# Patient Record
Sex: FEMALE | Race: WHITE
[De-identification: names, ages, dates, MRNs, and addresses within clinical notes are randomized per-mention and may not be internally consistent; named-entity substitution may affect disease eponyms.]

---

## 2020-02-17 ENCOUNTER — HOSPITAL ENCOUNTER (EMERGENCY)
Dept: HOSPITAL 35 - ER | Age: 80
Discharge: HOME | End: 2020-02-17
Payer: COMMERCIAL

## 2020-02-17 VITALS — HEIGHT: 63 IN | WEIGHT: 160.01 LBS | BODY MASS INDEX: 28.35 KG/M2

## 2020-02-17 VITALS — DIASTOLIC BLOOD PRESSURE: 61 MMHG | SYSTOLIC BLOOD PRESSURE: 143 MMHG

## 2020-02-17 DIAGNOSIS — R33.9: Primary | ICD-10-CM

## 2020-02-17 DIAGNOSIS — I10: ICD-10-CM

## 2020-02-17 LAB
ALBUMIN SERPL-MCNC: 3.9 G/DL (ref 3.4–5)
ALT SERPL-CCNC: 31 U/L (ref 30–65)
ANION GAP SERPL CALC-SCNC: 10 MMOL/L (ref 7–16)
AST SERPL-CCNC: 38 U/L (ref 15–37)
BASOPHILS NFR BLD AUTO: 0.6 % (ref 0–2)
BILIRUB SERPL-MCNC: 0.6 MG/DL
BILIRUB UR-MCNC: NEGATIVE MG/DL
BUN SERPL-MCNC: 15 MG/DL (ref 7–18)
CALCIUM SERPL-MCNC: 9.3 MG/DL (ref 8.5–10.1)
CHLORIDE SERPL-SCNC: 97 MMOL/L (ref 98–107)
CO2 SERPL-SCNC: 25 MMOL/L (ref 21–32)
COLOR UR: YELLOW
CREAT SERPL-MCNC: 0.9 MG/DL (ref 0.6–1)
EOSINOPHIL NFR BLD: 0.7 % (ref 0–3)
ERYTHROCYTE [DISTWIDTH] IN BLOOD BY AUTOMATED COUNT: 14 % (ref 10.5–14.5)
GLUCOSE SERPL-MCNC: 119 MG/DL (ref 74–106)
GRANULOCYTES NFR BLD MANUAL: 78.3 % (ref 36–66)
HCT VFR BLD CALC: 41.6 % (ref 37–47)
HGB BLD-MCNC: 13.5 GM/DL (ref 12–15)
KETONES UR STRIP-MCNC: NEGATIVE MG/DL
LYMPHOCYTES NFR BLD AUTO: 11 % (ref 24–44)
MCH RBC QN AUTO: 27.6 PG (ref 26–34)
MCHC RBC AUTO-ENTMCNC: 32.4 G/DL (ref 28–37)
MCV RBC: 85.2 FL (ref 80–100)
MONOCYTES NFR BLD: 9.4 % (ref 1–8)
NEUTROPHILS # BLD: 10.3 THOU/UL (ref 1.4–8.2)
PLATELET # BLD: 329 THOU/UL (ref 150–400)
POTASSIUM SERPL-SCNC: 3.7 MMOL/L (ref 3.5–5.1)
PROT SERPL-MCNC: 8.4 G/DL (ref 6.4–8.2)
RBC # BLD AUTO: 4.88 MIL/UL (ref 4.2–5)
RBC # UR STRIP: NEGATIVE /UL
SODIUM SERPL-SCNC: 132 MMOL/L (ref 136–145)
SP GR UR STRIP: 1.01 (ref 1–1.03)
URINE CLARITY: CLEAR
URINE GLUCOSE-RANDOM*: NEGATIVE
URINE LEUKOCYTES-REFLEX: NEGATIVE
URINE NITRITE-REFLEX: NEGATIVE
URINE PROTEIN (DIPSTICK): NEGATIVE
UROBILINOGEN UR STRIP-ACNC: 0.2 E.U./DL (ref 0.2–1)
WBC # BLD AUTO: 13.2 THOU/UL (ref 4–11)

## 2020-10-07 ENCOUNTER — HOSPITAL ENCOUNTER (OUTPATIENT)
Dept: HOSPITAL 35 - ER | Age: 80
Setting detail: OBSERVATION
LOS: 2 days | Discharge: HOME | End: 2020-10-09
Attending: HOSPITALIST | Admitting: HOSPITALIST
Payer: COMMERCIAL

## 2020-10-07 VITALS — SYSTOLIC BLOOD PRESSURE: 228 MMHG | DIASTOLIC BLOOD PRESSURE: 116 MMHG

## 2020-10-07 VITALS — HEIGHT: 62.99 IN | WEIGHT: 160 LBS | BODY MASS INDEX: 28.35 KG/M2

## 2020-10-07 DIAGNOSIS — E66.3: ICD-10-CM

## 2020-10-07 DIAGNOSIS — Z79.899: ICD-10-CM

## 2020-10-07 DIAGNOSIS — R47.01: ICD-10-CM

## 2020-10-07 DIAGNOSIS — I10: ICD-10-CM

## 2020-10-07 DIAGNOSIS — I16.0: ICD-10-CM

## 2020-10-07 DIAGNOSIS — G45.9: Primary | ICD-10-CM

## 2020-10-07 LAB
ANION GAP SERPL CALC-SCNC: 8 MMOL/L (ref 7–16)
BASOPHILS NFR BLD AUTO: 0.6 % (ref 0–2)
BILIRUB UR-MCNC: NEGATIVE MG/DL
BUN SERPL-MCNC: 18 MG/DL (ref 7–18)
CALCIUM SERPL-MCNC: 9.3 MG/DL (ref 8.5–10.1)
CHLORIDE SERPL-SCNC: 101 MMOL/L (ref 98–107)
CO2 SERPL-SCNC: 29 MMOL/L (ref 21–32)
COLOR UR: YELLOW
CREAT SERPL-MCNC: 1.1 MG/DL (ref 0.6–1)
EOSINOPHIL NFR BLD: 4.5 % (ref 0–3)
ERYTHROCYTE [DISTWIDTH] IN BLOOD BY AUTOMATED COUNT: 14 % (ref 10.5–14.5)
GLUCOSE SERPL-MCNC: 103 MG/DL (ref 74–106)
GRANULOCYTES NFR BLD MANUAL: 50.6 % (ref 36–66)
HCT VFR BLD CALC: 42.7 % (ref 37–47)
HGB BLD-MCNC: 13.8 GM/DL (ref 12–15)
KETONES UR STRIP-MCNC: NEGATIVE MG/DL
LYMPHOCYTES NFR BLD AUTO: 32.7 % (ref 24–44)
MCH RBC QN AUTO: 27.9 PG (ref 26–34)
MCHC RBC AUTO-ENTMCNC: 32.2 G/DL (ref 28–37)
MCV RBC: 86.6 FL (ref 80–100)
MONOCYTES NFR BLD: 11.6 % (ref 1–8)
NEUTROPHILS # BLD: 3.7 THOU/UL (ref 1.4–8.2)
PLATELET # BLD: 285 THOU/UL (ref 150–400)
POTASSIUM SERPL-SCNC: 4.1 MMOL/L (ref 3.5–5.1)
RBC # BLD AUTO: 4.93 MIL/UL (ref 4.2–5)
RBC # UR STRIP: NEGATIVE /UL
SODIUM SERPL-SCNC: 138 MMOL/L (ref 136–145)
SP GR UR STRIP: <= 1.005 (ref 1–1.03)
TROPONIN I SERPL-MCNC: <0.06 NG/ML (ref ?–0.06)
URINE CLARITY: CLEAR
URINE GLUCOSE-RANDOM*: NEGATIVE
URINE LEUKOCYTES-REFLEX: NEGATIVE
URINE NITRITE-REFLEX: NEGATIVE
URINE PROTEIN (DIPSTICK): NEGATIVE
UROBILINOGEN UR STRIP-ACNC: 0.2 E.U./DL (ref 0.2–1)
WBC # BLD AUTO: 7.3 THOU/UL (ref 4–11)

## 2020-10-07 PROCEDURE — 10081 I&D PILONIDAL CYST COMP: CPT

## 2020-10-07 NOTE — HC
[facility]
Miriam [address]
[place]   [address]                     CONSULTATION                  
_______________________________________________________________________________
 
Name:       CORY HIGUERA                 Room #:         170-14      ADM IN  
M.R.#:      [id number]                       Account #:      [id number]  
Admission:  10/07/20    Attend Phys:    Maite Miramontes MD  
Discharge:              Date of Birth:  [birth year]  
                                                          Report #: [id number]
                                                                    [id number]   
_______________________________________________________________________________
THIS REPORT FOR:  
 
cc:  Diana Gorman MD,Mic Wolf MD, MD                                         ~
CC: Diana Miramontes
 
DATE OF SERVICE:  10/08/2020
 
 
HISTORY OF PRESENT ILLNESS:  This is an 79-year-old female patient who was
evaluated by me for any neurological etiology for an episode she had.  She said
she lost her speech.  Her words just did not come out.  She did not have any
associated focal motor deficit.  The symptoms came spontaneously.  Within few
minutes, it resolved by itself.  She estimated that to be about 10-15 minutes.
 
I had talked to the Emergency Room provider yesterday.  She does not have any
prior history of stroke or TIA.
 
REVIEW OF SYSTEMS:  A 14-point review of system was carried out.  She does not
have a history of diabetes.  She does have a history of hypertension.  The blood
pressure stays around 140 according to her.  She was given some medication,
which produced cough.  She stopped taking it.  I assume it was ACE inhibitor,
but she does not know the name.  She said it did not make any difference to her
blood pressure.  Otherwise, according to her, 14-point review of system was
mostly unremarkable.  She had glaucoma, but she takes some drop.  She does not
complain of presently any eye, ENT, cardiac, respiratory, GI, ,
musculoskeletal, constitutional, dermatological, hematological, psychiatric,
throat, allergic symptom associated with present symptomatology.
 
PAST MEDICAL HISTORY:  Negative for this kind of episode.
 
FAMILY HISTORY:  Positive for TIA, but that was in later stage of the life.
 
SOCIAL HISTORY:  She is not a smoker.
 
PHYSICAL EXAMINATION:  Indicate she is alert, responsive.  Her speech,
concentration, fund of knowledge and memory is unremarkable.  Cranial nerve
examination 2-12 was mostly unremarkable.  I did not see any hemianopsia or any
facial weakness.  She has symmetrical strength, sensation, reflexes and tone in
all 4 extremities.  Her plantars are mute.  I could not look at the fundus. 
There is no meningeal sign.  There is no carotid bruit.  Pulses appeared to be
palpable.  She is moderately built individual who does not have any dysmorphic
features of eyes, ears and face.  Her vision and hearing look adequate.  Cardiac
examinations appear unremarkable.  No respiratory difficulty was noticed.  Blood
 
 
 
[address]   [address]                     CONSULTATION                  
_______________________________________________________________________________
 
Name:       CORY HIGUERA                 Room #:         [facility]      ADM IN  
M.R.#:      [id number]                       Account #:      [id number]  
Admission:  10/07/20    Attend Phys:    Maite Miramontes MD  
Discharge:              Date of Birth:  [birth year]  
                                                          Report #: [id number]
                                                                    [id number]   
_______________________________________________________________________________
pressure is 117/66, respirations 20, pulse is 83.
 
LABORATORY DATA:  Indicate a white count of 7.3.  Her creatinine was 1.1.  She
has no thyroid mass.  There is no carotid bruit.
 
IMPRESSION:  This patient appeared to have transient ischemic attack.  Her
creatinine was somewhat high at 1.1.  I have talked to Emergency Room provider
and recommended that they talk to Radiology as well as Nephrology to see if CT
angiogram can be done.  From talking to them, they tell me they cleared the
patient's CT angiogram and they did that and that was unremarkable. 
Subsequently, the patient had an MRI and MRA and I reviewed that and that is
also unremarkable.  She was not on aspirin.  I think it will be okay to start
the patient on aspirin, but she should take full 325 mg of aspirin.  I did
discuss the possibility of taking a combination of aspirin and Plavix for 30
days and subsequently going back on one of those, but she preferred just to take
aspirin, but I think she should take 325 mg half aspirin and that is okay.  Her
LDL is 83, so I think she can be started on some statin, although her HDL is
also high.  I will defer that to admitting.  She does need some more workup. 
She will need some sed rate, ARTUR, cardiolipin antibodies, homocysteine as an
outpatient.  She needs an echocardiogram with a bubble study.  PT, OT consult is
pending, and if she ambulates well, from my perspective, she can be sent home
and have those tests done as an outpatient and she can follow up with us in
about 1-2 weeks and we can check on those testing.  If she has any further
symptoms, she should go to Emergency Room.
 
Thank you very much for this referral.
 
 
 
 
 
 
 
 
 
 
 
 
 
 
 
 
 
 
                         
   By:                               
                   
D: 10/08/20 0948                           _____________________________________
T: 10/08/20 1341                           Mic Jewell MD           /nt

## 2020-10-08 VITALS — SYSTOLIC BLOOD PRESSURE: 166 MMHG | DIASTOLIC BLOOD PRESSURE: 60 MMHG

## 2020-10-08 VITALS — SYSTOLIC BLOOD PRESSURE: 152 MMHG | DIASTOLIC BLOOD PRESSURE: 63 MMHG

## 2020-10-08 VITALS — DIASTOLIC BLOOD PRESSURE: 66 MMHG | SYSTOLIC BLOOD PRESSURE: 149 MMHG

## 2020-10-08 VITALS — SYSTOLIC BLOOD PRESSURE: 146 MMHG | DIASTOLIC BLOOD PRESSURE: 68 MMHG

## 2020-10-08 LAB
ALBUMIN SERPL-MCNC: 3.3 G/DL (ref 3.4–5)
ALT SERPL-CCNC: 26 U/L (ref 30–65)
ANION GAP SERPL CALC-SCNC: 11 MMOL/L (ref 7–16)
ANION GAP SERPL CALC-SCNC: 7 MMOL/L (ref 7–16)
AST SERPL-CCNC: 25 U/L (ref 15–37)
BILIRUB SERPL-MCNC: 0.3 MG/DL (ref 0.2–1)
BUN SERPL-MCNC: 19 MG/DL (ref 7–18)
BUN SERPL-MCNC: 19 MG/DL (ref 7–18)
CALCIUM SERPL-MCNC: 8.5 MG/DL (ref 8.5–10.1)
CALCIUM SERPL-MCNC: 8.6 MG/DL (ref 8.5–10.1)
CHLORIDE SERPL-SCNC: 106 MMOL/L (ref 98–107)
CHLORIDE SERPL-SCNC: 108 MMOL/L (ref 98–107)
CHOLEST SERPL-MCNC: 159 MG/DL (ref ?–200)
CO2 SERPL-SCNC: 25 MMOL/L (ref 21–32)
CO2 SERPL-SCNC: 27 MMOL/L (ref 21–32)
CREAT SERPL-MCNC: 0.9 MG/DL (ref 0.6–1)
CREAT SERPL-MCNC: 1.1 MG/DL (ref 0.6–1)
EOSINOPHIL NFR BLD: 3 % (ref 0–3)
ERYTHROCYTE [DISTWIDTH] IN BLOOD BY AUTOMATED COUNT: 13.7 % (ref 10.5–14.5)
GLUCOSE SERPL-MCNC: 88 MG/DL (ref 74–106)
GLUCOSE SERPL-MCNC: 90 MG/DL (ref 74–106)
GRANULOCYTES NFR BLD MANUAL: 47 % (ref 36–66)
HCT VFR BLD CALC: 38.2 % (ref 37–47)
HDLC SERPL-MCNC: 54 MG/DL (ref 40–?)
HGB BLD-MCNC: 12.4 GM/DL (ref 12–15)
LDLC SERPL-MCNC: 83 MG/DL (ref ?–100)
LYMPHOCYTES NFR BLD AUTO: 37 % (ref 24–44)
MAGNESIUM SERPL-MCNC: 2.2 MG/DL (ref 1.8–2.4)
MCH RBC QN AUTO: 27.9 PG (ref 26–34)
MCHC RBC AUTO-ENTMCNC: 32.3 G/DL (ref 28–37)
MCV RBC: 86.4 FL (ref 80–100)
MONOCYTES NFR BLD: 9 % (ref 1–8)
NEUTROPHILS # BLD: 3.1 THOU/UL (ref 1.4–8.2)
PLATELET # BLD EST: NORMAL 10*3/UL
PLATELET # BLD: 243 THOU/UL (ref 150–400)
POTASSIUM SERPL-SCNC: 3.7 MMOL/L (ref 3.5–5.1)
POTASSIUM SERPL-SCNC: 3.9 MMOL/L (ref 3.5–5.1)
PROT SERPL-MCNC: 6.7 G/DL (ref 6.4–8.2)
RBC # BLD AUTO: 4.42 MIL/UL (ref 4.2–5)
RBC MORPH BLD: NORMAL
SODIUM SERPL-SCNC: 140 MMOL/L (ref 136–145)
SODIUM SERPL-SCNC: 144 MMOL/L (ref 136–145)
TC:HDL: 2.9 RATIO
TRIGL SERPL-MCNC: 114 MG/DL (ref ?–150)
VARIANT LYMPHS NFR BLD MANUAL: 4 %
VLDLC SERPL CALC-MCNC: 23 MG/DL (ref ?–40)
WBC # BLD AUTO: 6.5 THOU/UL (ref 4–11)

## 2020-10-08 NOTE — EKG
[facility]
[place]   [address]                     ELECTROCARDIOGRAM REPORT      
_______________________________________________________________________________
 
Name:       CORY HIGUERA                 Room #:         170-      ADM IN  
M.R.#:      [id number]                       Account #:      [id number]  
Admission:  10/07/20    Attend Phys:    Maite Miramontes MD  
Discharge:              Date of Birth:  [birth year]  
                                                          Report #: [id number]
                                                                    [id number]
_______________________________________________________________________________
THIS REPORT FOR:  
 
cc:  Diana Gorman MD, Danielle R MD Lundgren,Michael KOTHARI MD [facility]                                        ~
THIS REPORT FOR:   //name//                          
 
                         [facility] ED
                                       
Test Date:    2020-10-07               Test Time:    18:28:32
Pat Name:     CORY HIGUERA              Department:   
Patient ID:   [id number]            Room:         170
Gender:       F                        Technician:   ESHEETS
:          1940               Requested By: Ildefonso Hernandez
Order Number: [id number] MD:   Michael Win
                                 Measurements
Intervals                              Axis          
Rate:         77                       P:            57
NM:           128                      QRS:          50
QRSD:         92                       T:            37
QT:           387                                    
QTc:          438                                    
                           Interpretive Statements
Sinus rhythm
No significant abnormality
Compared to ECG 2015 16:45:14
No significant changes
Electronically Signed On 10-8-2020 7:38:21 CDT by Michael Win
[email or web address]
 
 
 
 
 
 
 
 
 
 
 
 
 
 
 
  <ELECTRONICALLY SIGNED>
   By: Michael Win MD, [facility]   
  10/08/20     0738
D: 10/07/20 1828                           _____________________________________
T: 10/07/20 182                           Michael Win MD, [facility]     /EPI

## 2020-10-09 VITALS — DIASTOLIC BLOOD PRESSURE: 70 MMHG | SYSTOLIC BLOOD PRESSURE: 154 MMHG

## 2020-10-09 VITALS — DIASTOLIC BLOOD PRESSURE: 65 MMHG | SYSTOLIC BLOOD PRESSURE: 167 MMHG

## 2020-10-09 VITALS — SYSTOLIC BLOOD PRESSURE: 167 MMHG | DIASTOLIC BLOOD PRESSURE: 65 MMHG

## 2020-10-09 VITALS — DIASTOLIC BLOOD PRESSURE: 69 MMHG | SYSTOLIC BLOOD PRESSURE: 155 MMHG

## 2020-10-09 LAB
EST. AVERAGE GLUCOSE BLD GHB EST-MCNC: 128 MG/DL
GLYCOHEMOGLOBIN (HGB A1C): 6.1 % (ref 4.8–5.6)

## 2020-10-09 NOTE — 2DMMODE
[facility]
[place]   [address]                   2 D/M-MODE ECHOCARDIOGRAM     
_______________________________________________________________________________
 
Name:       ROSENDO HIGUERARACHEL BRUNO                 Room #:         210-P       [facility] 
M.R.#:      [id number]                       Account #:      [id number]  
Admission:  10/07/20    Attend Phys:    Maite Miramontes MD  
Discharge:              Date of Birth:  [birth year]  
                                                          Report #: [id number]
                                                                    [id number]
_______________________________________________________________________________
THIS REPORT FOR:  
 
cc:  Diana Gorman MD, Danielle R MD Lammoglia, Francisco J. MD                                        
                                                                       ~
 
--------------- APPROVED REPORT --------------
 
 
Study performed:  10/09/2020 11:09:32
 
EXAM: Comprehensive 2D, Doppler, and color-flow 
Echocardiogram 
Patient Location: Bedside   
Room #:  210     Status:  routine
 
      BSA:         1.74
HR: 71 bpm BP:          167/65 mmHg 
Rhythm: NSR     
 
Other Information 
Study Quality: Adequate
 
Indications
CVA/TIA 
Hypertension/HDD
 
Echo Enhancing Agent
Indication: Rule out Shunt
Agent(s) / Amount(s) Used: Agitated Saline 7 cc
 
2D Dimensions
RVDd:  32.33 mm  
IVSd:  8.76 (7-11mm) LVOT Diam:  18.14 (18-24mm) 
LVDd:  44.57 mm  
PWd:  9.36 (7-11mm) Ascending Ao:  30.50 (22-36mm)
LVDs:  32.78 (25-40mm) 
Aortic Root:  29.71 mm IVC:  19.00 mm
 
Volumes
Left Atrial Volume (Systole) 
Single Plane 4CH:  15.70 mL Single Plane 2CH:  34.09 mL
    LA ESV Index:  16.00 mL/m2
 
Aortic Valve
 
 
[facility]
[address]
[place]  [address]
Phone:  [phone]                    2 D/M-MODE ECHOCARDIOGRAM     
_______________________________________________________________________________
 
Name:            CORY HIGUERA                 Room #:        210-P       [facility] [place]
..#:           [id number]          Account #:     [id number]  
Admission:       10/07/20         Attend Phys:   Maite Miramontes, 
Discharge:                  Date of Birth: [birth year]  
                         Report #:      [id number]
        [id number]
_______________________________________________________________________________
 LVOT Max PG:  3.44 mmHg
 LVOT Max V:  0.93 m/s
 
Mitral Valve
    E/A Ratio:  1.2
    MV Decel. Time:  176.90 ms
MV E Max Hubert.:  0.87 m/s 
MV A Hubert.:  0.75 m/s  
MV PHT:  51.30 ms  
IVRT:  92.27 ms   
 
Pulmonary Valve
PV Peak Hubert.:  0.77 m/s PV Peak Gr.:  2.40 mmHg
 
Pulmonary Vein
P Vein S:    0.22 m/s P Vein A:  0.27 m/s
P Vein D:   0.20 m/s P Vein A Dur.:  78.4 msec
P Vein S/D Ratio:  1.10 
 
Tricuspid Valve
TR Peak Hubert.:  2.28 m/s  
TR Peak Gr.:  20.71 mmHg 
    PA Pressure:  26.00 mmHg
 
Left Ventricle
The left ventricle is normal size. There is normal LV segmental wall 
motion. There is normal left ventricular wall thickness. Left 
ventricular systolic function is normal. The left ventricular 
ejection fraction is within the normal range. LVEF is 55-60%. Grade 
II - pseudonormal filling dynamics.
 
Right Ventricle
The right ventricle is normal size. The right ventricular systolic 
function is normal.
 
Atria
The left atrium size is normal. The right atrium size is 
normal.
 
Aortic Valve
The aortic valve is normal in structure. The Aortic valve is 
sclerotic. Mild aortic regurgitation. There is no aortic valvular 
stenosis.
 
Mitral Valve
The mitral valve is normal in structure. There is no mitral valve 
 
 
[facility]
[address]
[place]  [address]
Phone:  [phone]                    2 D/M-MODE ECHOCARDIOGRAM     
_______________________________________________________________________________
 
Name:            CORY HIGUERA                 Room #:        210-P       [facility] [place]
[facility].#:           [id number]          Account #:     [id number]  
Admission:       10/07/20         Attend Phys:   Maite Miramontes, 
Discharge:                  Date of Birth: [birth year]  
                         Report #:      [id number]
        [id number]
_______________________________________________________________________________
regurgitation noted. No evidence of mitral valve stenosis.
 
Tricuspid Valve
The tricuspid valve is normal in structure. There is trace tricuspid 
regurgitation. Estimated PAP 26 mmHg. There is no pulmonary 
hypertension.
 
Pulmonic Valve
The pulmonary valve is normal in structure. There is no pulmonic 
valvular regurgitation.
 
Great Vessels
The aortic root is normal in size. IVC is normal in size and 
collapses >50% with inspiration.
 
Pericardium
There is no pericardial effusion.
 
<Conclusion>
The left ventricle is normal size.
LVEF is 55-60%.
The aortic valve is normal in structure.
The Aortic valve is sclerotic.
Mild aortic regurgitation.
The mitral valve is normal in structure.
The tricuspid valve is normal in structure.
There is trace tricuspid regurgitation. Estimated PAP 26 mmHg.
There is no pulmonary hypertension.
The pulmonary valve is normal in structure.
There is no pericardial effusion.
 
 
 
 
 
 
 
 
 
 
 
 
 
 
  <ELECTRONICALLY SIGNED>
   By: Niraj Tsang MD    
  10/09/20     1358
D: 10/09/20 1358                           _____________________________________
T: 10/09/20 1358                           Niraj Tsang MD      /INF

## 2020-10-09 NOTE — NUR
[facility] STROKE NOT CALLED PER DR MELENDEZ DUE TO HAVING NO SYMPTOMS
ORDERS RECEIVED FOR PT EVAL AND TREAT. Pt ADMITTED WITH EXPRESSIVE APHASIA,
POSSIBLE TIA OR CVA. MRI NEGATIVE FOR ACUTE INFARCT. EXPRESSIVE APHASIA HAS
RESOLVED. DENIED N/T. LIVES ALONE IN HOME WITH 5 TANNER WITH HR AND SPLIT LEVEL
INSIDE WITH 6 STEPS WITH BILAT HR. DENIED RECENT FALLS. NO GAIT AIDS AT
BASELINE. Pt HAS BEEN UP AD [facility] SINCE ARRIVAL AND REPORTS NO DIFFICULTIES WITH
MOBILITY. Pt DECLINING PT NEEDS AT THIS TIME. ACUTE PT TO SIGN OFF.
PT CARE ASSUMED 1445. TRANSFER FROM ED. ASSESSMENT AS CHARTED. MEDICATION AS
CHARTED. PT ADMISSION COMPLETE. LAC IV. NORMAL SINUS RHYTHM. AO X 4. UP AD
[facility]. MRI: NEGATIVE. POSSIBLE TIA.
PT CARE ASSUMED AT 0700. ASSESSMENT AS CHARTED. MEDICATION AS CHARTED. ECHO
COMPLETE; NORMAL. LAC IV. UP AD [facility]. AO X 4. NORMAL SINUS RHYTHM. PT TO BE
DISCHARGED HOME. PAPERWORK SIGNED. TELEMETRY D/C'D. IV D/C'D.
pt care assummed at the change of shift, pt is awake, alert and oriented,
assessments as charted, vss, denied having concerns, progressing well towards
discharge; passed on report
normal